# Patient Record
Sex: FEMALE | Race: ASIAN | Employment: FULL TIME | URBAN - METROPOLITAN AREA
[De-identification: names, ages, dates, MRNs, and addresses within clinical notes are randomized per-mention and may not be internally consistent; named-entity substitution may affect disease eponyms.]

---

## 2019-03-18 PROCEDURE — 86800 THYROGLOBULIN ANTIBODY: CPT | Performed by: NURSE PRACTITIONER

## 2019-10-11 PROBLEM — E61.1 LOW IRON: Status: ACTIVE | Noted: 2019-10-11

## 2019-10-11 PROBLEM — E55.9 HYPOVITAMINOSIS D: Status: ACTIVE | Noted: 2019-10-11

## 2020-10-30 PROBLEM — R40.0 DAYTIME SLEEPINESS: Status: ACTIVE | Noted: 2020-10-30

## 2020-10-30 PROBLEM — R06.83 SNORING: Status: ACTIVE | Noted: 2020-10-30

## 2021-10-21 PROBLEM — F90.9 ATTENTION DEFICIT HYPERACTIVITY DISORDER (ADHD), UNSPECIFIED ADHD TYPE: Status: ACTIVE | Noted: 2021-10-21

## 2021-10-21 PROBLEM — F41.1 GENERALIZED ANXIETY DISORDER: Status: ACTIVE | Noted: 2021-10-21

## 2024-07-18 ENCOUNTER — HOSPITAL ENCOUNTER (EMERGENCY)
Facility: HOSPITAL | Age: 34
Discharge: HOME | End: 2024-07-19
Attending: EMERGENCY MEDICINE
Payer: COMMERCIAL

## 2024-07-18 DIAGNOSIS — H81.10 BENIGN PAROXYSMAL POSITIONAL VERTIGO, UNSPECIFIED LATERALITY: Primary | ICD-10-CM

## 2024-07-18 DIAGNOSIS — R79.89 LOW TSH LEVEL: ICD-10-CM

## 2024-07-18 LAB
BASOPHILS # BLD AUTO: 0.03 X10(3) UL (ref 0–0.2)
BASOPHILS NFR BLD AUTO: 0.4 %
DEPRECATED RDW RBC AUTO: 44.5 FL (ref 35.1–46.3)
EOSINOPHIL # BLD AUTO: 0.02 X10(3) UL (ref 0–0.7)
EOSINOPHIL NFR BLD AUTO: 0.3 %
ERYTHROCYTE [DISTWIDTH] IN BLOOD BY AUTOMATED COUNT: 14.9 % (ref 11–15)
HCT VFR BLD AUTO: 41.9 %
HGB BLD-MCNC: 13 G/DL
IMM GRANULOCYTES # BLD AUTO: 0.01 X10(3) UL (ref 0–1)
IMM GRANULOCYTES NFR BLD: 0.1 %
LYMPHOCYTES # BLD AUTO: 1.97 X10(3) UL (ref 1–4)
LYMPHOCYTES NFR BLD AUTO: 27.4 %
MCH RBC QN AUTO: 25.6 PG (ref 26–34)
MCHC RBC AUTO-ENTMCNC: 31 G/DL (ref 31–37)
MCV RBC AUTO: 82.6 FL
MONOCYTES # BLD AUTO: 0.49 X10(3) UL (ref 0.1–1)
MONOCYTES NFR BLD AUTO: 6.8 %
NEUTROPHILS # BLD AUTO: 4.68 X10 (3) UL (ref 1.5–7.7)
NEUTROPHILS # BLD AUTO: 4.68 X10(3) UL (ref 1.5–7.7)
NEUTROPHILS NFR BLD AUTO: 65 %
PLATELET # BLD AUTO: 381 10(3)UL (ref 150–450)
RBC # BLD AUTO: 5.07 X10(6)UL
WBC # BLD AUTO: 7.2 X10(3) UL (ref 4–11)

## 2024-07-18 PROCEDURE — 99284 EMERGENCY DEPT VISIT MOD MDM: CPT

## 2024-07-18 PROCEDURE — 96374 THER/PROPH/DIAG INJ IV PUSH: CPT

## 2024-07-18 PROCEDURE — 84481 FREE ASSAY (FT-3): CPT | Performed by: EMERGENCY MEDICINE

## 2024-07-18 PROCEDURE — 93010 ELECTROCARDIOGRAM REPORT: CPT

## 2024-07-18 PROCEDURE — 84439 ASSAY OF FREE THYROXINE: CPT | Performed by: EMERGENCY MEDICINE

## 2024-07-18 PROCEDURE — 80048 BASIC METABOLIC PNL TOTAL CA: CPT | Performed by: EMERGENCY MEDICINE

## 2024-07-18 PROCEDURE — 80076 HEPATIC FUNCTION PANEL: CPT | Performed by: EMERGENCY MEDICINE

## 2024-07-18 PROCEDURE — 85025 COMPLETE CBC W/AUTO DIFF WBC: CPT | Performed by: EMERGENCY MEDICINE

## 2024-07-18 PROCEDURE — 96361 HYDRATE IV INFUSION ADD-ON: CPT

## 2024-07-18 PROCEDURE — 84443 ASSAY THYROID STIM HORMONE: CPT | Performed by: EMERGENCY MEDICINE

## 2024-07-18 PROCEDURE — 93005 ELECTROCARDIOGRAM TRACING: CPT

## 2024-07-18 RX ORDER — MECLIZINE HYDROCHLORIDE 25 MG/1
25 TABLET ORAL ONCE
Status: COMPLETED | OUTPATIENT
Start: 2024-07-18 | End: 2024-07-18

## 2024-07-18 RX ORDER — ONDANSETRON 2 MG/ML
4 INJECTION INTRAMUSCULAR; INTRAVENOUS ONCE
Status: COMPLETED | OUTPATIENT
Start: 2024-07-18 | End: 2024-07-18

## 2024-07-19 VITALS
SYSTOLIC BLOOD PRESSURE: 99 MMHG | DIASTOLIC BLOOD PRESSURE: 64 MMHG | OXYGEN SATURATION: 96 % | TEMPERATURE: 98 F | RESPIRATION RATE: 20 BRPM | HEART RATE: 55 BPM

## 2024-07-19 LAB
ALBUMIN SERPL-MCNC: 4.6 G/DL (ref 3.2–4.8)
ALP LIVER SERPL-CCNC: 43 U/L
ALT SERPL-CCNC: 14 U/L
ANION GAP SERPL CALC-SCNC: 5 MMOL/L (ref 0–18)
AST SERPL-CCNC: 18 U/L (ref ?–34)
ATRIAL RATE: 59 BPM
BILIRUB DIRECT SERPL-MCNC: 0.3 MG/DL (ref ?–0.3)
BILIRUB SERPL-MCNC: 1 MG/DL (ref 0.3–1.2)
BUN BLD-MCNC: 9 MG/DL (ref 9–23)
BUN/CREAT SERPL: 11 (ref 10–20)
CALCIUM BLD-MCNC: 10 MG/DL (ref 8.7–10.4)
CHLORIDE SERPL-SCNC: 107 MMOL/L (ref 98–112)
CO2 SERPL-SCNC: 29 MMOL/L (ref 21–32)
CREAT BLD-MCNC: 0.82 MG/DL
EGFRCR SERPLBLD CKD-EPI 2021: 97 ML/MIN/1.73M2 (ref 60–?)
GLUCOSE BLD-MCNC: 100 MG/DL (ref 70–99)
OSMOLALITY SERPL CALC.SUM OF ELEC: 291 MOSM/KG (ref 275–295)
P AXIS: 24 DEGREES
P-R INTERVAL: 132 MS
POTASSIUM SERPL-SCNC: 4.3 MMOL/L (ref 3.5–5.1)
PROT SERPL-MCNC: 8.4 G/DL (ref 5.7–8.2)
Q-T INTERVAL: 440 MS
QRS DURATION: 82 MS
QTC CALCULATION (BEZET): 435 MS
R AXIS: 36 DEGREES
SODIUM SERPL-SCNC: 141 MMOL/L (ref 136–145)
T AXIS: 9 DEGREES
T3FREE SERPL-MCNC: 2.85 PG/ML (ref 2.4–4.2)
T4 FREE SERPL-MCNC: 1.9 NG/DL (ref 0.8–1.7)
TSI SER-ACNC: 0.21 MIU/ML (ref 0.55–4.78)
VENTRICULAR RATE: 59 BPM

## 2024-07-19 RX ORDER — MECLIZINE HYDROCHLORIDE 25 MG/1
25 TABLET ORAL 3 TIMES DAILY PRN
Qty: 21 TABLET | Refills: 0 | Status: SHIPPED | OUTPATIENT
Start: 2024-07-19

## 2024-07-19 RX ORDER — ONDANSETRON 4 MG/1
4 TABLET, ORALLY DISINTEGRATING ORAL EVERY 4 HOURS PRN
Qty: 10 TABLET | Refills: 0 | Status: SHIPPED | OUTPATIENT
Start: 2024-07-19 | End: 2024-07-26

## 2024-07-19 NOTE — DISCHARGE INSTRUCTIONS
Thank you for seeking care at [facility] Emergency Department.  You have been seen and evaluated for dizziness.    We discussed the results of your workup   Please read the instructions provided   If given prescriptions, take as instructed    Remember, your care process does not end after your visit today. Please follow-up with your doctor within 1-2 days for a follow-up check to ensure you are  improving, to see if you need any further evaluation/testing, or to evaluate for any alternate diagnoses.     Please return to the emergency department immediately if you develop a change to the dizziness you are experiencing, severe headache, loss of consciousness or passing out, vomiting, fever, neck stiffness, or if you develop any other new or concerning symptoms as these could be signs of more serious medical illness.    We hope you feel better.

## 2024-07-19 NOTE — ED PROVIDER NOTES
Patient Seen in: [facility] Emergency Department    History   No chief complaint on file.    Stated Complaint: dizziness, nausea    HPI    Patient is a very pleasant 34F hx of ADHD, anxiety, hypothyroidism, migraine headaches, complains of room-spinning dizziness that started 2 days ago. Patient a couple of days ago she turned her head and has since been experiencing intermittent episodes of room-spinning sensation. She states it feels like she can really only hold her head slightly to the right because this and sitting still stop the dizziness, but head turning/position change makes it much worse. Reports associated nausea with symptoms. She has been able to ambulate. She denies trauma or injury prior to onset of symptoms. She has had similar in the past but has never been formally diagnosed with vertigo. She denies ear pain, hearing loss, sore throat, cough, or recent URI symptoms.  She does report mild frontal headache similar to prior migraines, no thunderclap or worst of life.  She denies any fever or chills.  She denies any double vision, vision loss or other visual changes.  She does report she had tingling in her right hand in triage when clenching her fist that resolved after about 1-2 seconds. She denies any other numbness, tingling or weakness in her face or extremities. No CP, SOB, abd pain, diarrhea, dysuria or other complaints.     Past Medical History:    ADHD (attention deficit hyperactivity disorder)    Anxiety    Hypothyroidism    Migraines       Past Surgical History:   Procedure Laterality Date    Arthrotomy,open repair meniscus              Family History   Problem Relation Age of Onset    Ovarian Cancer Maternal Grandmother     BRCA gene + Maternal Grandmother     Hypertension Mother     Diabetes Mother     Other (hypothyroid) Mother     Diabetes Father     Breast Cancer Maternal Aunt     Ovarian Cancer Maternal Aunt        Social History     Socioeconomic History    Marital status:  Unknown   Tobacco Use    Smoking status: Never    Smokeless tobacco: Never   Vaping Use    Vaping status: Never Used   Substance and Sexual Activity    Alcohol use: No    Drug use: No       Review of Systems    Positive for stated complaint: dizziness, nausea  Other systems are as noted in HPI.  Constitutional and vital signs reviewed.      All other systems reviewed and negative except as noted above.    PSFH elements reviewed from today and agreed except as otherwise stated in HPI.    Physical Exam     ED Triage Vitals   BP 07/18/24 2119 109/78   Pulse 07/18/24 2119 67   Resp 07/18/24 2119 20   Temp 07/18/24 2119 97.8 °F (36.6 °C)   Temp src --    SpO2 07/18/24 2119 100 %   O2 Device 07/18/24 2330 None (Room air)       Current:BP 99/64   Pulse 55   Temp 97.8 °F (36.6 °C)   Resp 20   SpO2 96%    PULSE OX 96% on RA   GENERAL: awake alert in no distress.   HEAD: normocephalic, atraumatic. Tms clear bilaterally.   EYES: PERRLA, EOMI, conj sclera clear, no nystagmus  THROAT: mmm, no lesions  NECK: supple, no meningeal signs  LUNGS: no resp distress, cta bilateral no wheezes or crackles   CARDIO: RRR without murmur. 2+ radial pulses bilaterally.   GI: abdomen is soft and non tender, no masses  EXTREMITIES: from, 5/5 strength in all 4 ext, no edema  NEURO: alert and oiented x3, CN 2-12 intact, no focal deficit noted. Strength is 5/5 bilat UE and LE prox and distally with SILT bilat UE and LE prox and distally and symmetric. Finger to nose intact bilaterally. Negative romberg. Ambulatory with steady gait, no ataxia. 2+ patellar reflexes bilaterally.   SKIN: good skin turgor, no  rashes  PSYCH: calm, cooperative      ED Course     Labs Reviewed   BASIC METABOLIC PANEL (8) - Abnormal; Notable for the following components:       Result Value    Glucose 100 (*)     All other components within normal limits   HEPATIC FUNCTION PANEL (7) - Abnormal; Notable for the following components:    Total Protein 8.4 (*)     All other  components within normal limits   TSH W REFLEX TO FREE T4 - Abnormal; Notable for the following components:    TSH 0.209 (*)     All other components within normal limits   T4, FREE (S) - Abnormal; Notable for the following components:    Free T4 1.9 (*)     All other components within normal limits   CBC W/ DIFFERENTIAL - Abnormal; Notable for the following components:    MCH 25.6 (*)     All other components within normal limits   FREE T3 (TRIIODOTHYRONINE) - Normal   CBC WITH DIFFERENTIAL WITH PLATELET    Narrative:     The following orders were created for panel order CBC With Differential With Platelet.  Procedure                               Abnormality         Status                     ---------                               -----------         ------                     CBC W/ DIFFERENTIAL[[id number]]          Abnormal            Final result                 Please view results for these tests on the individual orders.     EKG    Ecg my interpretation sinus bradycardia rate 59 bpm, normal axis, normal intervals, qtc 435 ms, no stemi, no brugada pattern, no delta wave            MDM       Cardiac Monitor:   Pulse Readings from Last 1 Encounters:   07/19/24 55   , sinus,      Radiology findings: No results found.        MDM      This patient presents with intermittent room spinning dizziness for the past 2 days. Pt ambulates with steady gait. She has no focal neurologic deficits on her exam. She has no dysmetria or ataxia on exam.     Differential diagnoses considered includes, but is not limited to:   BPPV  Labyrinthitis   Viral syndrome  Orthostasis  Anemia  Electrolyte abnormality   Low suspicion for dysrhythmia   Clinically low suspicion for central cause such as posterior circulation stroke, vertebral artery dissection, or ICH/mass     Will obtain the following tests: cbc, cmp, tsh ecg   Will administer the following medications/therapies: IV NS bolus, zofran, meclizine     Please see ED course for my  independent review of these tests/imaging results.    Chronic conditions affecting care: anxiety, migraine, adhd     Workup and medications considered but not ordered: brain imaging such as CTA head/neck. Clinically pt symptoms are consistent with peripheral vertigo, pt low risk for CVA/TIA, low suspicion spontaneous arterial dissection, low suspicion for ICH or that brain imaging will .       ED Course as of 07/19/24 1759  ------------------------------------------------------------  Time: 07/18 2321  Comment: Ecg my interpretation sinus bradycardia rate 59 bpm, normal axis, normal intervals, qtc 435 ms, no stemi, no brugada pattern, no delta wave   ------------------------------------------------------------  Time: 07/19 0022  Value: TSH(!): 0.209  Comment: (Reviewed)  ------------------------------------------------------------  Time: 07/19 0022  Comment: Cbc unremarkable no anemia. Cmp and hepatic panel unremarkable.   ------------------------------------------------------------  Time: 07/19 0105  Value: T3 FREE: 2.85  Comment: (Reviewed)  ------------------------------------------------------------  Time: 07/19 0105  Value: TSH(!): 0.209  Comment: Labs likely c/w mild hypothyroidism   ------------------------------------------------------------  Time: 07/19 0152  Comment: Pt updated with results. Pt sts feeling much improved and that dizziness has resolved after medicines here. All questions answered. Updated with incidental finding of mild TSH decrease and T4 elevation, while T3 normal, clinically low suspicion for thyroiditis but recommended endocrinology f/u as outpatient within 7 days. Meclizine and zofran Rx sent and counseled pt to f/u with neurology as outpatient as well. Counseled to return with new or worsening sx or change in condition. She feels well and feels comfortable with the discharge plan at this time.          Disposition and Plan     Clinical Impression:  1. Benign  paroxysmal positional vertigo, unspecified laterality    2. Low TSH level         Disposition:  Discharge  7/19/2024  1:52 am    Follow-up:  Lisa Stanford MD  [address]  [address]  [place] [address] [phone]    Schedule an appointment as soon as possible for a visit in 2 day(s)      [facility] Emergency Department  [address] [facility] [id number] [phone]  Go to  If symptoms worsen, immediately    Quinten Cason MD  [address]  [address]  [facility] [id number] [phone]    Schedule an appointment as soon as possible for a visit in 1 week(s)      Patricia Davis MD  [address]  [address]  [facility] [address] [phone]    Schedule an appointment as soon as possible for a visit in 1 week(s)  regarding decreased TSH level          Medications Prescribed:  Discharge Medication List as of 7/19/2024  1:59 AM        START taking these medications    Details   meclizine 25 MG Oral Tab Take 1 tablet (25 mg total) by mouth 3 (three) times daily as needed for Dizziness., Normal, Disp-21 tablet, R-0      ondansetron 4 MG Oral Tablet Dispersible Take 1 tablet (4 mg total) by mouth every 4 (four) hours as needed for Nausea., Normal, Disp-10 tablet, R-0             Fanny Jackson,   Attending Physician  Emergency Medicine